# Patient Record
Sex: FEMALE | Race: OTHER
[De-identification: names, ages, dates, MRNs, and addresses within clinical notes are randomized per-mention and may not be internally consistent; named-entity substitution may affect disease eponyms.]

---

## 2018-05-16 VITALS
DIASTOLIC BLOOD PRESSURE: 80 MMHG | SYSTOLIC BLOOD PRESSURE: 119 MMHG | SYSTOLIC BLOOD PRESSURE: 119 MMHG | DIASTOLIC BLOOD PRESSURE: 80 MMHG | SYSTOLIC BLOOD PRESSURE: 119 MMHG | DIASTOLIC BLOOD PRESSURE: 80 MMHG

## 2018-07-30 ENCOUNTER — HOSPITAL ENCOUNTER (EMERGENCY)
Dept: HOSPITAL 61 - ER | Age: 23
Discharge: HOME | End: 2018-07-30
Payer: COMMERCIAL

## 2018-07-30 VITALS — DIASTOLIC BLOOD PRESSURE: 80 MMHG | SYSTOLIC BLOOD PRESSURE: 122 MMHG

## 2018-07-30 DIAGNOSIS — N89.8: ICD-10-CM
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
## 2018-07-30 DIAGNOSIS — O26.891: Primary | ICD-10-CM

## 2018-07-30 DIAGNOSIS — M54.5: ICD-10-CM

## 2018-07-30 DIAGNOSIS — Z3A.10: ICD-10-CM

## 2018-07-30 DIAGNOSIS — Z90.89: ICD-10-CM
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
## 2018-07-30 LAB
BACTERIA #/AREA URNS HPF: (no result) /HPF
BILIRUBIN,URINE: NEGATIVE
CLARITY,URINE: CLEAR
COLOR,URINE: YELLOW
GLUCOSE,URINE: NEGATIVE MG/DL
NITRITE UR QL STRIP: NEGATIVE
PH,URINE: 6
PROTEIN,URINE: NEGATIVE MG/DL
RBC,URINE: (no result) /HPF (ref 0–2)
SPECIFIC GRAVITY,URINE: >=1.03
SQUAMOUS EPITHELIAL CELL,UR: (no result) /LPF
UROBILINOGEN,URINE: 0.2 MG/DL
WBC #/AREA URNS HPF: (no result) /HPF (ref 0–4)

## 2018-07-30 PROCEDURE — 99284 EMERGENCY DEPT VISIT MOD MDM: CPT

## 2018-07-30 PROCEDURE — 87491 CHLMYD TRACH DNA AMP PROBE: CPT

## 2018-07-30 PROCEDURE — 81001 URINALYSIS AUTO W/SCOPE: CPT

## 2018-07-30 PROCEDURE — 87591 N.GONORRHOEAE DNA AMP PROB: CPT

## 2018-07-30 RX ADMIN — ACETAMINOPHEN 1 MG: 500 TABLET ORAL at 22:03

## 2018-07-30 RX ADMIN — CYCLOBENZAPRINE HYDROCHLORIDE 1 MG: 10 TABLET, FILM COATED ORAL at 22:03

## 2018-11-09 ENCOUNTER — HOSPITAL ENCOUNTER (OUTPATIENT)
Dept: HOSPITAL 61 - US | Age: 23
Discharge: HOME | End: 2018-11-09
Attending: FAMILY MEDICINE
Payer: COMMERCIAL

## 2018-11-09 DIAGNOSIS — Z34.82: Primary | ICD-10-CM

## 2018-11-09 DIAGNOSIS — Z3A.24: ICD-10-CM

## 2018-11-09 PROCEDURE — 76805 OB US >/= 14 WKS SNGL FETUS: CPT

## 2018-11-09 NOTE — RAD
OB ultrasound dated 11/9/2018:

 

No comparison available.

Clinical Indication: Size and dates.

 

Findings:

 

There is a single intrauterine gestation in transverse presentation. The 

placenta is posterior in location without evidence of placental previa. 

The amount of amniotic fluid appears appropriate. Cervical length is 

estimated at 4.9 cm.

 

Biometrical data is as follows:

 

BPD = 5.7 cm for 23 weeks 3 days.

HC   = 21.9 cm for 24 weeks 0 days.

AC   = 18.9 cmfor 23 weeks 5 days.

FL    = 4.7 cm for  25 weeks 6 days.

 

Overall, the estimated sonographic gestational age is 24 weeks 2 days for 

an estimated date of delivery of 2/27/2019. 

 

Estimated fetal weight is 703 g.

 

A 4 chamber heart is identified with positive cardiac activity. The 

estimated fetal heart rate is 145 beats per minute. Bilateral upper and 

lower extremities are identified. There is a three-vessel cord with cord 

insertion visualized. Fetal stomach and urinary bladder are identified. 

Both kidneys are seen.

The fetal spine and brain are unremarkable. No gross fetal abnormalities 

are identified.

 

Impression:

 

Single viable intrauterine gestation with estimated sonographic 

gestational age of 24 weeks 2 days. No apparent fetal abnormality.

 

Electronically signed by: Skinny Adame MD (11/9/2018 5:08 PM) 

[id number]

## 2018-11-15 ENCOUNTER — HOSPITAL ENCOUNTER (OUTPATIENT)
Dept: HOSPITAL 61 - LAB | Age: 23
Discharge: HOME | End: 2018-11-15
Attending: FAMILY MEDICINE
Payer: COMMERCIAL

## 2018-11-15 DIAGNOSIS — O46.8X2: Primary | ICD-10-CM

## 2018-11-15 DIAGNOSIS — Z3A.25: ICD-10-CM

## 2018-11-15 LAB
HCT VFR BLD CALC: 33.6 % (ref 36–47)
HGB BLD-MCNC: 11.6 G/DL (ref 12–15.5)
MCHC RBC AUTO-ENTMCNC: 34 G/DL (ref 31–37)

## 2018-11-15 PROCEDURE — 85014 HEMATOCRIT: CPT

## 2018-11-15 PROCEDURE — 82947 ASSAY GLUCOSE BLOOD QUANT: CPT

## 2018-11-15 PROCEDURE — 36415 COLL VENOUS BLD VENIPUNCTURE: CPT

## 2018-11-15 PROCEDURE — 85018 HEMOGLOBIN: CPT

## 2018-11-15 PROCEDURE — 82950 GLUCOSE TEST: CPT

## 2018-11-24 ENCOUNTER — HOSPITAL ENCOUNTER (OUTPATIENT)
Dept: HOSPITAL 61 - 3 SO LND | Age: 23
Setting detail: OBSERVATION
Discharge: HOME | End: 2018-11-24
Attending: FAMILY MEDICINE | Admitting: FAMILY MEDICINE
Payer: COMMERCIAL

## 2018-11-24 DIAGNOSIS — R10.32: ICD-10-CM

## 2018-11-24 DIAGNOSIS — Z3A.26: ICD-10-CM

## 2018-11-24 DIAGNOSIS — O26.893: Primary | ICD-10-CM

## 2018-11-24 LAB
APTT PPP: YELLOW S
BACTERIA #/AREA URNS HPF: (no result) /HPF
BILIRUB UR QL STRIP: NEGATIVE
FIBRINOGEN PPP-MCNC: CLEAR MG/DL
NITRITE UR QL STRIP: NEGATIVE
PH UR STRIP: 6.5 [PH]
PROT UR STRIP-MCNC: NEGATIVE MG/DL
RBC #/AREA URNS HPF: (no result) /HPF (ref 0–2)
SQUAMOUS #/AREA URNS LPF: (no result) /LPF
UROBILINOGEN UR-MCNC: 0.2 MG/DL
WBC #/AREA URNS HPF: (no result) /HPF (ref 0–4)

## 2018-11-24 PROCEDURE — 81001 URINALYSIS AUTO W/SCOPE: CPT

## 2018-11-24 PROCEDURE — G0379 DIRECT REFER HOSPITAL OBSERV: HCPCS

## 2019-01-03 ENCOUNTER — HOSPITAL ENCOUNTER (OUTPATIENT)
Dept: HOSPITAL 61 - 3 SO LND | Age: 24
Setting detail: OBSERVATION
Discharge: HOME | End: 2019-01-03
Attending: FAMILY MEDICINE | Admitting: FAMILY MEDICINE
Payer: COMMERCIAL

## 2019-01-03 DIAGNOSIS — R10.9: ICD-10-CM

## 2019-01-03 DIAGNOSIS — O36.8130: Primary | ICD-10-CM

## 2019-01-03 DIAGNOSIS — O26.893: ICD-10-CM

## 2019-01-03 DIAGNOSIS — Z3A.32: ICD-10-CM

## 2019-01-03 LAB
APTT PPP: YELLOW S
BACTERIA #/AREA URNS HPF: (no result) /HPF
BILIRUB UR QL STRIP: NEGATIVE
FIBRINOGEN PPP-MCNC: CLEAR MG/DL
NITRITE UR QL STRIP: NEGATIVE
PH UR STRIP: 6.5 [PH]
PROT UR STRIP-MCNC: NEGATIVE MG/DL
RBC #/AREA URNS HPF: 0 /HPF (ref 0–2)
SQUAMOUS #/AREA URNS LPF: (no result) /LPF
UROBILINOGEN UR-MCNC: 0.2 MG/DL
WBC #/AREA URNS HPF: (no result) /HPF (ref 0–4)

## 2019-01-03 PROCEDURE — G0379 DIRECT REFER HOSPITAL OBSERV: HCPCS

## 2019-01-03 PROCEDURE — 87086 URINE CULTURE/COLONY COUNT: CPT

## 2019-01-03 PROCEDURE — 81001 URINALYSIS AUTO W/SCOPE: CPT

## 2019-01-03 PROCEDURE — G0378 HOSPITAL OBSERVATION PER HR: HCPCS

## 2019-02-25 ENCOUNTER — HOSPITAL ENCOUNTER (INPATIENT)
Dept: HOSPITAL 61 - 3 SO LND | Age: 24
LOS: 3 days | Discharge: HOME | End: 2019-02-28
Attending: FAMILY MEDICINE | Admitting: FAMILY MEDICINE
Payer: COMMERCIAL

## 2019-02-25 VITALS — HEIGHT: 59 IN | WEIGHT: 167 LBS | BODY MASS INDEX: 33.67 KG/M2

## 2019-02-25 DIAGNOSIS — Z3A.40: ICD-10-CM

## 2019-02-25 LAB
APTT PPP: YELLOW S
BACTERIA #/AREA URNS HPF: (no result) /HPF
BASOPHILS # BLD AUTO: 0 X10^3/UL (ref 0–0.2)
BASOPHILS NFR BLD: 0 % (ref 0–3)
BILIRUB UR QL STRIP: NEGATIVE
EOSINOPHIL NFR BLD: 0.1 X10^3/UL (ref 0–0.7)
EOSINOPHIL NFR BLD: 1 % (ref 0–3)
ERYTHROCYTE [DISTWIDTH] IN BLOOD BY AUTOMATED COUNT: 15.4 % (ref 11.5–14.5)
FIBRINOGEN PPP-MCNC: CLEAR MG/DL
HCT VFR BLD CALC: 34.1 % (ref 36–47)
HGB BLD-MCNC: 11.5 G/DL (ref 12–15.5)
LYMPHOCYTES # BLD: 1.5 X10^3/UL (ref 1–4.8)
LYMPHOCYTES NFR BLD AUTO: 17 % (ref 24–48)
MCH RBC QN AUTO: 28 PG (ref 25–35)
MCHC RBC AUTO-ENTMCNC: 34 G/DL (ref 31–37)
MCV RBC AUTO: 84 FL (ref 79–100)
MONO #: 0.6 X10^3/UL (ref 0–1.1)
MONOCYTES NFR BLD: 6 % (ref 0–9)
NEUT #: 6.7 X10^3UL (ref 1.8–7.7)
NEUTROPHILS NFR BLD AUTO: 75 % (ref 31–73)
NITRITE UR QL STRIP: NEGATIVE
PH UR STRIP: 6 [PH]
PLATELET # BLD AUTO: 189 X10^3/UL (ref 140–400)
PROT UR STRIP-MCNC: NEGATIVE MG/DL
RBC # BLD AUTO: 4.07 X10^6/UL (ref 3.5–5.4)
RBC #/AREA URNS HPF: 0 /HPF (ref 0–2)
SQUAMOUS #/AREA URNS LPF: (no result) /LPF
UROBILINOGEN UR-MCNC: 0.2 MG/DL
WBC # BLD AUTO: 8.8 X10^3/UL (ref 4–11)
WBC #/AREA URNS HPF: (no result) /HPF (ref 0–4)

## 2019-02-25 PROCEDURE — 92585: CPT

## 2019-02-25 PROCEDURE — 36415 COLL VENOUS BLD VENIPUNCTURE: CPT

## 2019-02-25 PROCEDURE — 86592 SYPHILIS TEST NON-TREP QUAL: CPT

## 2019-02-25 PROCEDURE — 82247 BILIRUBIN TOTAL: CPT

## 2019-02-25 PROCEDURE — 85025 COMPLETE CBC W/AUTO DIFF WBC: CPT

## 2019-02-25 PROCEDURE — 87086 URINE CULTURE/COLONY COUNT: CPT

## 2019-02-25 PROCEDURE — 85007 BL SMEAR W/DIFF WBC COUNT: CPT

## 2019-02-25 PROCEDURE — 86900 BLOOD TYPING SEROLOGIC ABO: CPT

## 2019-02-25 PROCEDURE — 86901 BLOOD TYPING SEROLOGIC RH(D): CPT

## 2019-02-25 PROCEDURE — 81001 URINALYSIS AUTO W/SCOPE: CPT

## 2019-02-25 PROCEDURE — 86850 RBC ANTIBODY SCREEN: CPT

## 2019-02-25 RX ADMIN — SODIUM CHLORIDE, SODIUM LACTATE, POTASSIUM CHLORIDE, AND CALCIUM CHLORIDE SCH MLS/HR: .6; .31; .03; .02 INJECTION, SOLUTION INTRAVENOUS at 20:56

## 2019-02-25 RX ADMIN — SODIUM CHLORIDE, SODIUM LACTATE, POTASSIUM CHLORIDE, AND CALCIUM CHLORIDE SCH MLS/HR: .6; .31; .03; .02 INJECTION, SOLUTION INTRAVENOUS at 21:46

## 2019-02-26 VITALS — DIASTOLIC BLOOD PRESSURE: 74 MMHG | SYSTOLIC BLOOD PRESSURE: 113 MMHG

## 2019-02-26 VITALS — SYSTOLIC BLOOD PRESSURE: 104 MMHG | DIASTOLIC BLOOD PRESSURE: 67 MMHG

## 2019-02-26 VITALS — DIASTOLIC BLOOD PRESSURE: 58 MMHG | SYSTOLIC BLOOD PRESSURE: 113 MMHG

## 2019-02-26 VITALS — DIASTOLIC BLOOD PRESSURE: 59 MMHG | SYSTOLIC BLOOD PRESSURE: 99 MMHG

## 2019-02-26 PROCEDURE — 0KQM0ZZ REPAIR PERINEUM MUSCLE, OPEN APPROACH: ICD-10-PCS | Performed by: FAMILY MEDICINE

## 2019-02-26 PROCEDURE — 3E0P7VZ INTRODUCTION OF HORMONE INTO FEMALE REPRODUCTIVE, VIA NATURAL OR ARTIFICIAL OPENING: ICD-10-PCS | Performed by: FAMILY MEDICINE

## 2019-02-26 PROCEDURE — 00HU33Z INSERTION OF INFUSION DEVICE INTO SPINAL CANAL, PERCUTANEOUS APPROACH: ICD-10-PCS | Performed by: FAMILY MEDICINE

## 2019-02-26 PROCEDURE — 3E033VJ INTRODUCTION OF OTHER HORMONE INTO PERIPHERAL VEIN, PERCUTANEOUS APPROACH: ICD-10-PCS | Performed by: FAMILY MEDICINE

## 2019-02-26 PROCEDURE — 3E0R3BZ INTRODUCTION OF ANESTHETIC AGENT INTO SPINAL CANAL, PERCUTANEOUS APPROACH: ICD-10-PCS | Performed by: FAMILY MEDICINE

## 2019-02-26 RX ADMIN — DOCUSATE SODIUM PRN MG: 100 CAPSULE, LIQUID FILLED ORAL at 21:27

## 2019-02-26 RX ADMIN — IBUPROFEN PRN MG: 400 TABLET ORAL at 21:29

## 2019-02-26 NOTE — PDOC1
OB - History


Hx of Present Pregnancy


Prenatal Care:  Good Care


Ultrasounds:  Normal mid trimester US


Obstetrical Complications:  None


Medical Complications:  None





Past Family/Social History


*


Past Medical, Surgical, Family and Obstetric Histories reviewed from prenatal 

chart.


Blood Type:  B+


Rubella:  Immune


RPR/VDRL:  Negative


GBS Status:  Negative


HBsAG:  Negative





OB - Chief Complaint & HPI


Date of Admission:


Date of Admission:  2019 at 19:32


Chief Complaint/History


:  2


Para:  1


EDC:  2019


Reason for admission:  induction of labor


Indication for induction:  other


Admission Nurse Assessment Rev:  No





OB - Admission Exam


Physical Exam


HEENT:  Normal, Nasal Mucosa Normal, Oropharynx Normal, Moist Membranes, 

Fontanelles Normal


Heart:  Regular Rate


Lungs:  Clear, Equal


Abdomen:  Gravid


Extremities:  Normal Pulses, No tenderness or swelling


Reflexes:  Normal


Cervical Dilatation:  4cm


Effacement:  100%


Station:  0


Membranes:  Intact


Fetal Heart Rate:  Normal


Accelerations:  Accelerations Present


Decelerations:  Early decelerations


Short Term Variability:  Present


Long Term Variability:  Moderate


Contractions on Admission:  < 5 Minutes Apart


Date/Time Contractions Began;:  19 at 0530


Frequency of Contractions:  q2min


Intensity:  Moderate


A/P


Pt is a 22yo  at 40wga admitted for IOL





1)IOL- pt now in active labor


2)Pain mgmt- pt getting epidural


3)GBS negative


4)Breastfeeding











MICHAEL AMAYA MD 2019 08:30

## 2019-02-26 NOTE — PDOC
VAGINAL DELIVERY


DATE


DATE: 19


TIME


1031


:  2


Para:  1


EDC:  2019


VAGINAL DELIVERY:  VTX


VACCUM ASSISTED:  No


PLACENTA:  Spontaneous


APGAR


9 and 9


SEX:  Female


WEIGHT


Weight 3215g or 7 pounds 1.4oz


Nuchal Cord:  No


Amniotic Fluid:  Clear


PAIN:  Epidural


EPISIOTOMY:  No


EXTENSION:  Yes (2nd degree right periurethral)


REPAIRED WITH


3'0" vicryl


EBL


250cc


COMPLICATIONS


None


CONDITION


Stable


PEDIATRICIAN


Dr. Amaya


Signs of Intrauterine Infectio:  None


Shoulder Dystocia:  No


DIAGNOSIS


Pt is a 24yo [id number] s/p induced vaginal delivery





1)Induced vaginal delivery


2)GBS negative


3)unsure of rupture time- both mom and baby have had no signs of infection, 

will CTM


4)Breastfeeding











MICHAEL AMAYA MD 2019 11:00

## 2019-02-27 VITALS — DIASTOLIC BLOOD PRESSURE: 70 MMHG | SYSTOLIC BLOOD PRESSURE: 110 MMHG

## 2019-02-27 VITALS — DIASTOLIC BLOOD PRESSURE: 63 MMHG | SYSTOLIC BLOOD PRESSURE: 102 MMHG

## 2019-02-27 VITALS — SYSTOLIC BLOOD PRESSURE: 96 MMHG | DIASTOLIC BLOOD PRESSURE: 53 MMHG

## 2019-02-27 VITALS — DIASTOLIC BLOOD PRESSURE: 50 MMHG | SYSTOLIC BLOOD PRESSURE: 80 MMHG

## 2019-02-27 LAB
BASOPHILS # BLD AUTO: 0 X10^3/UL (ref 0–0.2)
BASOPHILS NFR BLD: 0 % (ref 0–3)
EOSINOPHIL NFR BLD: 0.1 X10^3/UL (ref 0–0.7)
EOSINOPHIL NFR BLD: 1 % (ref 0–3)
ERYTHROCYTE [DISTWIDTH] IN BLOOD BY AUTOMATED COUNT: 16.2 % (ref 11.5–14.5)
HCT VFR BLD CALC: 34.1 % (ref 36–47)
HGB BLD-MCNC: 11.2 G/DL (ref 12–15.5)
LYMPHOCYTES # BLD: 2 X10^3/UL (ref 1–4.8)
LYMPHOCYTES NFR BLD AUTO: 22 % (ref 24–48)
MCH RBC QN AUTO: 28 PG (ref 25–35)
MCHC RBC AUTO-ENTMCNC: 33 G/DL (ref 31–37)
MCV RBC AUTO: 85 FL (ref 79–100)
MONO #: 0.5 X10^3/UL (ref 0–1.1)
MONOCYTES NFR BLD: 6 % (ref 0–9)
NEUT #: 6.5 X10^3UL (ref 1.8–7.7)
NEUTROPHILS NFR BLD AUTO: 72 % (ref 31–73)
PLATELET # BLD AUTO: 169 X10^3/UL (ref 140–400)
RBC # BLD AUTO: 4.01 X10^6/UL (ref 3.5–5.4)
WBC # BLD AUTO: 9.1 X10^3/UL (ref 4–11)

## 2019-02-27 RX ADMIN — IBUPROFEN PRN MG: 400 TABLET ORAL at 15:04

## 2019-02-27 RX ADMIN — IBUPROFEN PRN MG: 400 TABLET ORAL at 03:13

## 2019-02-27 RX ADMIN — IBUPROFEN PRN MG: 400 TABLET ORAL at 23:18

## 2019-02-27 RX ADMIN — DOCUSATE SODIUM PRN MG: 100 CAPSULE, LIQUID FILLED ORAL at 15:04

## 2019-02-27 NOTE — PDOC
OB Progress Note


Date of Service


02/27/19


Time of Evaluation


0800


Date:


02/26/19


Time:


1031


Notes


Pt doing well.  Breastfeeding is going well.  Milk has not come completely in.  

Bleeding minimal.  Pain well controlled.


OB VITAL SIGNS:  Temperature (98.2F), Blood Pressure (80/50), Pulse (62), O2 

Sat (99%)


Lab





Laboratory Tests








Test


 2/25/19


20:30 2/25/19


20:45 2/27/19


06:00


 


Urine Collection Type Unknown   


 


Urine Color Yellow   


 


Urine Clarity Clear   


 


Urine pH 6.0   


 


Urine Specific Gravity 1.020   


 


Urine Protein


 Negative mg/dL


(NEG-TRACE) 


 





 


Urine Glucose (UA)


 Negative mg/dL


(NEG) 


 





 


Urine Ketones (Stick)


 Negative mg/dL


(NEG) 


 





 


Urine Blood Negative (NEG)   


 


Urine Nitrite Negative (NEG)   


 


Urine Bilirubin Negative (NEG)   


 


Urine Urobilinogen Dipstick


 0.2 mg/dL (0.2


mg/dL) 


 





 


Urine Leukocyte Esterase Small (NEG)   


 


Urine RBC 0 /HPF (0-2)   


 


Urine WBC


 5-10 /HPF


(0-4) 


 





 


Urine Squamous Epithelial


Cells Mod /LPF 


 


 





 


Urine Bacteria


 Many /HPF


(0-FEW) 


 





 


Urine Mucus Mod /LPF   


 


White Blood Count


 


 8.8 x10^3/uL


(4.0-11.0) 9.1 x10^3/uL


(4.0-11.0)


 


Red Blood Count


 


 4.07 x10^6/uL


(3.50-5.40) 4.01 x10^6/uL


(3.50-5.40)


 


Hemoglobin


 


 11.5 g/dL


(12.0-15.5) 11.2 g/dL


(12.0-15.5)


 


Hematocrit


 


 34.1 %


(36.0-47.0) 34.1 %


(36.0-47.0)


 


Mean Corpuscular Volume  84 fL ()  85 fL () 


 


Mean Corpuscular Hemoglobin  28 pg (25-35)  28 pg (25-35) 


 


Mean Corpuscular Hemoglobin


Concent 


 34 g/dL


(31-37) 33 g/dL


(31-37)


 


Red Cell Distribution Width


 


 15.4 %


(11.5-14.5) 16.2 %


(11.5-14.5)


 


Platelet Count


 


 189 x10^3/uL


(140-400) 169 x10^3/uL


(140-400)


 


Neutrophils (%) (Auto)  75 % (31-73)  72 % (31-73) 


 


Lymphocytes (%) (Auto)  17 % (24-48)  22 % (24-48) 


 


Monocytes (%) (Auto)  6 % (0-9)  6 % (0-9) 


 


Eosinophils (%) (Auto)  1 % (0-3)  1 % (0-3) 


 


Basophils (%) (Auto)  0 % (0-3)  0 % (0-3) 


 


Neutrophils # (Auto)


 


 6.7 x10^3uL


(1.8-7.7) 6.5 x10^3uL


(1.8-7.7)


 


Lymphocytes # (Auto)


 


 1.5 x10^3/uL


(1.0-4.8) 2.0 x10^3/uL


(1.0-4.8)


 


Monocytes # (Auto)


 


 0.6 x10^3/uL


(0.0-1.1) 0.5 x10^3/uL


(0.0-1.1)


 


Eosinophils # (Auto)


 


 0.1 x10^3/uL


(0.0-0.7) 0.1 x10^3/uL


(0.0-0.7)


 


Basophils # (Auto)


 


 0.0 x10^3/uL


(0.0-0.2) 0.0 x10^3/uL


(0.0-0.2)


 


Treponema pallidum Antibody


 


 Nonreactive


(Nonreactive) 











Laboratory Tests








Test


 2/27/19


06:00


 


White Blood Count


 9.1 x10^3/uL


(4.0-11.0)


 


Red Blood Count


 4.01 x10^6/uL


(3.50-5.40)


 


Hemoglobin


 11.2 g/dL


(12.0-15.5)


 


Hematocrit


 34.1 %


(36.0-47.0)


 


Mean Corpuscular Volume 85 fL () 


 


Mean Corpuscular Hemoglobin 28 pg (25-35) 


 


Mean Corpuscular Hemoglobin


Concent 33 g/dL


(31-37)


 


Red Cell Distribution Width


 16.2 %


(11.5-14.5)


 


Platelet Count


 169 x10^3/uL


(140-400)


 


Neutrophils (%) (Auto) 72 % (31-73) 


 


Lymphocytes (%) (Auto) 22 % (24-48) 


 


Monocytes (%) (Auto) 6 % (0-9) 


 


Eosinophils (%) (Auto) 1 % (0-3) 


 


Basophils (%) (Auto) 0 % (0-3) 


 


Neutrophils # (Auto)


 6.5 x10^3uL


(1.8-7.7)


 


Lymphocytes # (Auto)


 2.0 x10^3/uL


(1.0-4.8)


 


Monocytes # (Auto)


 0.5 x10^3/uL


(0.0-1.1)


 


Eosinophils # (Auto)


 0.1 x10^3/uL


(0.0-0.7)


 


Basophils # (Auto)


 0.0 x10^3/uL


(0.0-0.2)








Medications





Current Medications


Sodium Chloride (Normal Saline Flush) 3 ml QSHIFT  PRN IV AFTER MEDS AND BLOOD 

DRAWS;  Start 2/25/19 at 20:00


Ringer's Solution 1,000 ml @  125 mls/hr Q8H IV  Last administered on 2/25/19at 

21:46;  Start 2/25/19 at 19:59


Acetaminophen (Tylenol) 650 mg PRN Q6HRS  PRN PO MILD PAIN / TEMP;  Start 2/25/ 19 at 20:00;  Stop 2/26/19 at 11:15;  Status DC


Ondansetron HCl (Zofran) 4 mg PRN Q4HRS  PRN IV NAUSEA/VOMITING;  Start 2/25/19 

at 20:00


Al Hydroxide/Mg Hydroxide (Mylanta Plus Xs) 30 ml PRN Q4HRS  PRN PO HEARTBURN / 

GAS;  Start 2/25/19 at 20:00;  Stop 2/26/19 at 11:15;  Status DC


Terbutaline Sulfate (Brethine) 0.25 mg 1X PRN  PRN SQ SEE COMMENTS;  Start 2/25/ 19 at 20:00;  Stop 2/26/19 at 19:59;  Status DC


Lidocaine HCl (Xylocaine 1% Pf 30ml Vial) 30 ml 1X PRN  PRN INJ SEE COMMENTS;  

Start 2/25/19 at 20:00;  Stop 2/27/19 at 19:59


Oxytocin/Sodium Chloride 500 ml @ 0 mls/hr CONT PRN  PRN IV Post delivery 

bleeding Last administered on 2/26/19at 10:08;  Start 2/25/19 at 20:00


Ibuprofen (Motrin) 800 mg PRN Q6HRS  PRN PO PAIN PREVENTION Last administered 

on 2/27/19at 03:13;  Start 2/25/19 at 20:00


Dinoprostone (Cervidil) 10 mg 1X  ONCE VG  Last administered on 2/25/19at 20:55

;  Start 2/25/19 at 20:30;  Stop 2/25/19 at 20:31;  Status DC


Ringer's Solution 1,000 ml @  1,000 mls/hr Q1H IV  Last administered on 2/26/ 19at 08:45;  Start 2/26/19 at 08:19;  Stop 2/26/19 at 09:18;  Status DC


Ringer's Solution 500 ml @  500 mls/hr 1X PRN  PRN IV HYPOTENSION;  Start 2/26/ 19 at 08:30;  Stop 2/27/19 at 08:29


Ephedrine Sulfate (ePHEDrine PF IN SALINE SYRINGE) 10 mg PRN Q2MIN  PRN IV IF 

SBP<90;  Start 2/26/19 at 08:30


Phenylephrine HCl (PHENYLEPHRINE in 0.9% NACL PF) 0.05 mg PRN Q2MIN  PRN IV SBP 

less than 90;  Start 2/26/19 at 08:30


Atropine Sulfate (ATROPINE 0.5mg SYRINGE) 0.4 mg PRN Q2MIN  PRN IV FOR 

SYMPTOMATIC BRADYCARDIA;  Start 2/26/19 at 08:30


Naloxone HCl (Narcan) 0.04 mg PRN Q1MIN  PRN IV SEE COMMENTS;  Start 2/26/19 at 

08:30


Fentanyl Citrate (Fentanyl 2ml Vial) 100 mcg PRN 1X  PRN EPI FOR ANESTHESIA 

Last administered on 2/26/19at 08:49;  Start 2/26/19 at 08:30;  Stop 2/27/19 at 

08:29


Bupivacaine HCl (Sensorcaine Mpf 0.25%) 10 ml PRN 1X  PRN EPID FOR ANESTHESIA 

Last administered on 2/26/19at 08:50;  Start 2/26/19 at 08:30;  Stop 2/27/19 at 

08:29


Ropivacaine/ Fentanyl/NS 50 ml @ 14 mls/hr CONT  PRN EPID PAIN Last 

administered on 2/26/19at 08:46;  Start 2/26/19 at 08:30


Ondansetron HCl (Zofran) 4 mg PRN Q6HRS  PRN IV NAUSEA/VOMITING;  Start 2/26/19 

at 08:30


Prochlorperazine Edisylate (Compazine) 5 mg PRN Q6HRS  PRN IV NAUSEA/VOMITING;  

Start 2/26/19 at 08:30


Diphenhydramine HCl (Benadryl) 12.5 mg PRN Q2HR  PRN IV ITCHING;  Start 2/26/19 

at 08:30


Nalbuphine HCl (Nubain) 2.5 mg PRN Q2HR  PRN IV ITCHING;  Start 2/26/19 at 08:30


Ropivacaine/ Sodium Chloride (ROPIVacaine 0.2% - 0.9%NACL PF) 40 mg 1X PRN  PRN 

EPID PER ANESTHESIA;  Start 2/26/19 at 08:30;  Stop 2/27/19 at 08:19


Sodium Chloride (Normal Saline Flush) 10 ml QSHIFT  PRN IV AFTER MEDS AND BLOOD 

DRAWS;  Start 2/26/19 at 11:15


Oxytocin/Sodium Chloride 500 ml @  62.5 mls/hr CONT  PRN IV SEE I/O RECORD;  

Start 2/26/19 at 11:15;  Stop 2/26/19 at 19:14;  Status DC


Acetaminophen (Tylenol) 650 mg PRN Q6HRS  PRN PO MILD PAIN / TEMP;  Start 2/26/ 19 at 11:15


Ibuprofen (Motrin) 800 mg PRN Q6HRS  PRN PO MODERATE PAIN;  Start 2/26/19 at 11:

15


Docusate Sodium (Colace) 100 mg PRN DAILY  PRN PO CONSTIPATION Last 

administered on 2/26/19at 21:27;  Start 2/26/19 at 11:15


Al Hydroxide/Mg Hydroxide (Mylanta Plus Xs) 30 ml PRN Q4HRS  PRN PO HEARTBURN / 

GAS;  Start 2/26/19 at 11:15


Simethicone (Gas-X) 80 mg PRN AFTMEALHC  PRN PO GAS / BLOATING;  Start 2/26/19 

at 11:15


Diphenhydramine HCl (Benadryl) 25 mg PRN Q6HRS  PRN PO ITCHING;  Start 2/26/19 

at 11:15


Benzocaine (Americaine) 1 spray PRN QID  PRN TP TOPICAL PAIN;  Start 2/26/19 at 

11:15


Phenyleph/Shark Oil/Min Oil/Petrol (Preparation H) 1 valentina PRN QID  PRN RC RECTAL 

PAIN;  Start 2/26/19 at 11:15


Hydrocortisone (Cortaid) 1 valentina PRN QID  PRN TP PERINEAL PAIN;  Start 2/26/19 at 

11:15





Active Scripts


Active


Cyclobenzaprine Hcl 10 Mg Tablet 10 Mg PO TID


Amoxicillin 875 Mg Tablet 1 Tab PO BID


Triamcinolone Acetonide 0.1% Oint (Triamcinolone Acetonide) 15 Gm Oint...g. 1 

Valentina TP TID


     MIX WITH EUCERIN


     AS DIRECTED BY PHYSICIAN


Reported


Prenata Chewable Tablet (Prenatal Vit37/Iron/Folic Acid) 1 Each Tab.chew 1 Each 

PO


Exam


GEN: NAD, AOx3


HEENT: MMM, EOMI, no scleral icterus/injection


Cardiac: RRR, no M/R/G


Lungs: CTAB


Abd: fundus 2cm below umbilicus


Ext: no erythema/edema LE bilaterally


Neuro: CN2-12 GI


Assessment


Pt is a 24yo P4wheP4 s/p induced vaginal delivery at 40wga





1)Induced vaginal delivery


2)Pain mgmt- well controlled with Ibuprofen.


3)GBS negative


4)Breastfeeding











MICHAEL AMAYA MD Feb 27, 2019 08:21

## 2019-02-28 VITALS — DIASTOLIC BLOOD PRESSURE: 62 MMHG | SYSTOLIC BLOOD PRESSURE: 94 MMHG

## 2019-02-28 NOTE — PDOC3
OB DISCHARGE SUMMARY


DATE OF ADMISSION:  


02/26/19


DATE OF DISCHARGE:  


02/28/19


REASON FOR ADMISSION:   Induction of labor


INTRAPARTUM PROCEDURES:  Spontanous Vag Deliv


DISCHARGE DIAGNOSIS:  Term Pregnancy Delivered


DISCHARGE INFORMATION:  Activity (As tolerated), Diet (Regular), Medications (

Ibuprofen, Docusate), Discharge to (Home)


HOSPITAL COURSE


Pt is a 22yo [id number] s/p induced vaginal delivery at 40wga





1)Induced vaginal delivery


2)Pain mgmt- well controlled with Ibuprofen.


3)GBS negative


4)Breastfeeding











MICHAEL AMAYA MD Feb 28, 2019 08:52

## 2019-12-23 VITALS
DIASTOLIC BLOOD PRESSURE: 61 MMHG | DIASTOLIC BLOOD PRESSURE: 61 MMHG | SYSTOLIC BLOOD PRESSURE: 100 MMHG | SYSTOLIC BLOOD PRESSURE: 100 MMHG | SYSTOLIC BLOOD PRESSURE: 100 MMHG | DIASTOLIC BLOOD PRESSURE: 61 MMHG

## 2020-03-25 ENCOUNTER — HOSPITAL ENCOUNTER (OUTPATIENT)
Dept: HOSPITAL 61 - LAB | Age: 25
Discharge: HOME | End: 2020-03-25
Attending: FAMILY MEDICINE
Payer: COMMERCIAL

## 2020-03-25 DIAGNOSIS — Z00.00: Primary | ICD-10-CM

## 2020-03-25 LAB
ALBUMIN SERPL-MCNC: 3.9 G/DL (ref 3.4–5)
ALBUMIN/GLOB SERPL: 1 {RATIO} (ref 1–1.7)
ALP SERPL-CCNC: 73 U/L (ref 46–116)
ALT SERPL-CCNC: 18 U/L (ref 14–59)
ANION GAP SERPL CALC-SCNC: 9 MMOL/L (ref 6–14)
AST SERPL-CCNC: 13 U/L (ref 15–37)
BASOPHILS # BLD AUTO: 0 X10^3/UL (ref 0–0.2)
BASOPHILS NFR BLD: 0 % (ref 0–3)
BILIRUB SERPL-MCNC: 0.3 MG/DL (ref 0.2–1)
BUN SERPL-MCNC: 11 MG/DL (ref 7–20)
BUN/CREAT SERPL: 18 (ref 6–20)
CALCIUM SERPL-MCNC: 8.8 MG/DL (ref 8.5–10.1)
CHLORIDE SERPL-SCNC: 102 MMOL/L (ref 98–107)
CHOLEST SERPL-MCNC: 196 MG/DL (ref 0–200)
CHOLEST/HDLC SERPL: 4.3 {RATIO}
CO2 SERPL-SCNC: 28 MMOL/L (ref 21–32)
CREAT SERPL-MCNC: 0.6 MG/DL (ref 0.6–1)
EOSINOPHIL NFR BLD: 0.2 X10^3/UL (ref 0–0.7)
EOSINOPHIL NFR BLD: 3 % (ref 0–3)
ERYTHROCYTE [DISTWIDTH] IN BLOOD BY AUTOMATED COUNT: 13.6 % (ref 11.5–14.5)
GFR SERPLBLD BASED ON 1.73 SQ M-ARVRAT: 120.8 ML/MIN
GLOBULIN SER-MCNC: 4 G/DL (ref 2.2–3.8)
GLUCOSE SERPL-MCNC: 86 MG/DL (ref 70–99)
HCT VFR BLD CALC: 39.7 % (ref 36–47)
HDLC SERPL-MCNC: 46 MG/DL (ref 40–60)
HGB BLD-MCNC: 13.2 G/DL (ref 12–15.5)
LDLC: 123 MG/DL (ref 0–100)
LYMPHOCYTES # BLD: 2.3 X10^3/UL (ref 1–4.8)
LYMPHOCYTES NFR BLD AUTO: 25 % (ref 24–48)
MCH RBC QN AUTO: 27 PG (ref 25–35)
MCHC RBC AUTO-ENTMCNC: 33 G/DL (ref 31–37)
MCV RBC AUTO: 81 FL (ref 79–100)
MONO #: 0.5 X10^3/UL (ref 0–1.1)
MONOCYTES NFR BLD: 5 % (ref 0–9)
NEUT #: 6.1 X10^3/UL (ref 1.8–7.7)
NEUTROPHILS NFR BLD AUTO: 66 % (ref 31–73)
PLATELET # BLD AUTO: 305 X10^3/UL (ref 140–400)
POTASSIUM SERPL-SCNC: 3.8 MMOL/L (ref 3.5–5.1)
PROT SERPL-MCNC: 7.9 G/DL (ref 6.4–8.2)
RBC # BLD AUTO: 4.9 X10^6/UL (ref 3.5–5.4)
SODIUM SERPL-SCNC: 139 MMOL/L (ref 136–145)
TRIGL SERPL-MCNC: 137 MG/DL (ref 0–150)
VLDLC: 27 MG/DL (ref 0–40)
WBC # BLD AUTO: 9.1 X10^3/UL (ref 4–11)

## 2020-03-25 PROCEDURE — 84443 ASSAY THYROID STIM HORMONE: CPT

## 2020-03-25 PROCEDURE — 85025 COMPLETE CBC W/AUTO DIFF WBC: CPT

## 2020-03-25 PROCEDURE — 80061 LIPID PANEL: CPT

## 2020-03-25 PROCEDURE — 80053 COMPREHEN METABOLIC PANEL: CPT

## 2020-03-25 PROCEDURE — 36415 COLL VENOUS BLD VENIPUNCTURE: CPT

## 2020-04-02 ENCOUNTER — HOSPITAL ENCOUNTER (OUTPATIENT)
Dept: HOSPITAL 61 - KCIC MRI | Age: 25
Discharge: HOME | End: 2020-04-02
Attending: ORTHOPAEDIC SURGERY
Payer: COMMERCIAL

## 2020-04-02 DIAGNOSIS — M25.461: Primary | ICD-10-CM

## 2020-04-02 PROCEDURE — 73721 MRI JNT OF LWR EXTRE W/O DYE: CPT

## 2020-04-02 NOTE — [facility]
Examination: MRI of the right knee without contrast

 

HISTORY: History of right knee pain

 

COMPARISON: None available 

 

Technique: Multiplanar, multisequence MR imaging of the right knee were 

performed without contrast.

 

 

Findings:

 

The anterior cruciate ligament, posterior cruciate ligament appear intact.

The medial meniscus, lateral meniscus appear intact. The medial collateral

ligament appears intact. The lateral collateral ligamentous complex 

including the fibular collateral ligament, biceps femoris tendon, 

popliteus tendon appear intact.

 

Extensor mechanism is intact. Small knee joint effusion is identified. The

medial retinaculum, lateral retinaculum appear intact. There is increased 

T2 signal identified in the Hoffa's fat in deep to the patella laterally.

 

 

 

IMPRESSION:

 

1. Increased T2 signal identified in the Hoffa's fat deep to the patella 

laterally probably secondary to impingement.

 

2. Small knee joint effusion.

 

Electronically signed by: Saad Wood MD (4/2/2020 12:59 PM) [id number]

## 2020-04-22 ENCOUNTER — HOSPITAL ENCOUNTER (OUTPATIENT)
Dept: HOSPITAL 61 - KCIC US | Age: 25
Discharge: HOME | End: 2020-04-22
Attending: NURSE PRACTITIONER
Payer: COMMERCIAL

## 2020-04-22 DIAGNOSIS — O34.80: Primary | ICD-10-CM

## 2020-04-22 DIAGNOSIS — N83.291: ICD-10-CM

## 2020-04-22 DIAGNOSIS — Z3A.00: ICD-10-CM

## 2020-04-22 PROCEDURE — 76817 TRANSVAGINAL US OBSTETRIC: CPT

## 2020-04-22 PROCEDURE — 76801 OB US < 14 WKS SINGLE FETUS: CPT

## 2020-04-22 NOTE — [facility]
Study: US OB <14 WKS W/TV 

 

DATE:  4/22/2020 8:00 AM

 

INDICATION: Evaluate pregnancy viability.

 

COMPARISON: None recently.

 

TECHNIQUE: Transabdominal ultrasonography of the pelvis was performed. 

Color Doppler and duplex were utilized as appropriate.

 

FINDINGS:

 

The uterus is measured at 8.3 x 5.0 x 7.0 cm. The endometrial echo 

measures up to 1.7 cm in width. No intrauterine pregnancy is identified at

this time. No abnormal vascularity to the endometrium. No uterine 

parenchymal abnormality.

 

The left ovary measures 2.5 x 2.2 x 2.4 cm with normal Doppler flow.

 

The right ovary measures 3.5 x 3.7 x 2.3 cm. Doppler flow is maintained to

the right ovary. Centrally hypoechoic structure within the right ovary 

measures 2.5 x 1.9 x 1.9 cm and is without overt hypervascularity. An 

additional hypoechoic structure at the upper aspect of the right ovary 

measures 2.2 x 1.7 x 1.6 cm. There is no significant vascularity to this 

finding as well.

 

Small amount of simple appearing free fluid within the deep pelvis.

 

IMPRESSION:

 

1.  No intrauterine pregnancy is identified at this time. Correlation is 

needed with beta hCG levels to determine if one would be expected to be 

seen. Based on the imaging findings alone, an early intrauterine 

pregnancy, failed first trimester pregnancy or nonvisualized ectopic 

pregnancy are all considerations. Short-term clinical follow-up is needed 

with subsequent ultrasound as deemed necessary.

2.  Two right ovarian cystic foci one of which appears represent a corpus 

luteum cyst and the other an uncomplicated cyst. These cysts do not 

exhibit features typical of an ovarian ectopic but again clinical 

correlation and follow-up is needed.

3.  Small amount of simple appearing free fluid within the deep pelvis.

 

Electronically signed by: TORI SHEFFIELD MD (4/22/2020 10:06 AM) 

[id number]

## 2020-04-25 ENCOUNTER — HOSPITAL ENCOUNTER (INPATIENT)
Dept: HOSPITAL 61 - ER | Age: 25
LOS: 1 days | Discharge: HOME | DRG: 819 | End: 2020-04-26
Attending: OBSTETRICS & GYNECOLOGY | Admitting: OBSTETRICS & GYNECOLOGY
Payer: COMMERCIAL

## 2020-04-25 VITALS — DIASTOLIC BLOOD PRESSURE: 67 MMHG | SYSTOLIC BLOOD PRESSURE: 105 MMHG

## 2020-04-25 VITALS — BODY MASS INDEX: 31.11 KG/M2 | WEIGHT: 154.32 LBS | HEIGHT: 59 IN

## 2020-04-25 VITALS — SYSTOLIC BLOOD PRESSURE: 96 MMHG | DIASTOLIC BLOOD PRESSURE: 62 MMHG

## 2020-04-25 VITALS — SYSTOLIC BLOOD PRESSURE: 107 MMHG | DIASTOLIC BLOOD PRESSURE: 62 MMHG

## 2020-04-25 VITALS — DIASTOLIC BLOOD PRESSURE: 65 MMHG | SYSTOLIC BLOOD PRESSURE: 104 MMHG

## 2020-04-25 VITALS — SYSTOLIC BLOOD PRESSURE: 89 MMHG | DIASTOLIC BLOOD PRESSURE: 51 MMHG

## 2020-04-25 VITALS — SYSTOLIC BLOOD PRESSURE: 99 MMHG | DIASTOLIC BLOOD PRESSURE: 60 MMHG

## 2020-04-25 VITALS — DIASTOLIC BLOOD PRESSURE: 66 MMHG | SYSTOLIC BLOOD PRESSURE: 108 MMHG

## 2020-04-25 DIAGNOSIS — Z90.49: ICD-10-CM

## 2020-04-25 DIAGNOSIS — O99.011: ICD-10-CM

## 2020-04-25 DIAGNOSIS — Z83.3: ICD-10-CM

## 2020-04-25 DIAGNOSIS — O00.101: Primary | ICD-10-CM

## 2020-04-25 DIAGNOSIS — D64.9: ICD-10-CM

## 2020-04-25 DIAGNOSIS — Z82.49: ICD-10-CM

## 2020-04-25 DIAGNOSIS — K66.0: ICD-10-CM

## 2020-04-25 DIAGNOSIS — Z3A.14: ICD-10-CM

## 2020-04-25 LAB
ALBUMIN SERPL-MCNC: 3.4 G/DL (ref 3.4–5)
ALBUMIN/GLOB SERPL: 0.9 {RATIO} (ref 1–1.7)
ALP SERPL-CCNC: 61 U/L (ref 46–116)
ALT SERPL-CCNC: 15 U/L (ref 14–59)
ANION GAP SERPL CALC-SCNC: 14 MMOL/L (ref 6–14)
APTT PPP: YELLOW S
AST SERPL-CCNC: 14 U/L (ref 15–37)
BACTERIA #/AREA URNS HPF: (no result) /HPF
BASOPHILS # BLD AUTO: 0 X10^3/UL (ref 0–0.2)
BASOPHILS NFR BLD: 0 % (ref 0–3)
BILIRUB SERPL-MCNC: 0.2 MG/DL (ref 0.2–1)
BILIRUB UR QL STRIP: NEGATIVE
BUN SERPL-MCNC: 12 MG/DL (ref 7–20)
BUN/CREAT SERPL: 20 (ref 6–20)
CALCIUM SERPL-MCNC: 8.3 MG/DL (ref 8.5–10.1)
CHLORIDE SERPL-SCNC: 103 MMOL/L (ref 98–107)
CO2 SERPL-SCNC: 23 MMOL/L (ref 21–32)
CREAT SERPL-MCNC: 0.6 MG/DL (ref 0.6–1)
EOSINOPHIL NFR BLD: 0.2 X10^3/UL (ref 0–0.7)
EOSINOPHIL NFR BLD: 1 % (ref 0–3)
ERYTHROCYTE [DISTWIDTH] IN BLOOD BY AUTOMATED COUNT: 14.2 % (ref 11.5–14.5)
ERYTHROCYTE [DISTWIDTH] IN BLOOD BY AUTOMATED COUNT: 14.2 % (ref 11.5–14.5)
FIBRINOGEN PPP-MCNC: CLEAR MG/DL
GFR SERPLBLD BASED ON 1.73 SQ M-ARVRAT: 120.8 ML/MIN
GLOBULIN SER-MCNC: 3.8 G/DL (ref 2.2–3.8)
GLUCOSE SERPL-MCNC: 142 MG/DL (ref 70–99)
HCT VFR BLD CALC: 26.2 % (ref 36–47)
HCT VFR BLD CALC: 35.7 % (ref 36–47)
HGB BLD-MCNC: 11.6 G/DL (ref 12–15.5)
HGB BLD-MCNC: 8.6 G/DL (ref 12–15.5)
LIPASE: 100 U/L (ref 73–393)
LYMPHOCYTES # BLD: 2.5 X10^3/UL (ref 1–4.8)
LYMPHOCYTES NFR BLD AUTO: 20 % (ref 24–48)
MCH RBC QN AUTO: 27 PG (ref 25–35)
MCH RBC QN AUTO: 27 PG (ref 25–35)
MCHC RBC AUTO-ENTMCNC: 33 G/DL (ref 31–37)
MCHC RBC AUTO-ENTMCNC: 33 G/DL (ref 31–37)
MCV RBC AUTO: 83 FL (ref 79–100)
MCV RBC AUTO: 83 FL (ref 79–100)
MONO #: 0.6 X10^3/UL (ref 0–1.1)
MONOCYTES NFR BLD: 5 % (ref 0–9)
NEUT #: 9.6 X10^3/UL (ref 1.8–7.7)
NEUTROPHILS NFR BLD AUTO: 75 % (ref 31–73)
NITRITE UR QL STRIP: NEGATIVE
PH UR STRIP: 5.5 [PH]
PLATELET # BLD AUTO: 229 X10^3/UL (ref 140–400)
PLATELET # BLD AUTO: 339 X10^3/UL (ref 140–400)
POTASSIUM SERPL-SCNC: 3.2 MMOL/L (ref 3.5–5.1)
PROT SERPL-MCNC: 7.2 G/DL (ref 6.4–8.2)
PROT UR STRIP-MCNC: 30 MG/DL
RBC # BLD AUTO: 3.16 X10^6/UL (ref 3.5–5.4)
RBC # BLD AUTO: 4.33 X10^6/UL (ref 3.5–5.4)
RBC #/AREA URNS HPF: 0 /HPF (ref 0–2)
SODIUM SERPL-SCNC: 140 MMOL/L (ref 136–145)
SQUAMOUS #/AREA URNS LPF: (no result) /LPF
UROBILINOGEN UR-MCNC: 0.2 MG/DL
WBC # BLD AUTO: 12.9 X10^3/UL (ref 4–11)
WBC # BLD AUTO: 15.3 X10^3/UL (ref 4–11)
WBC #/AREA URNS HPF: 0 /HPF (ref 0–4)

## 2020-04-25 PROCEDURE — 30233N1 TRANSFUSION OF NONAUTOLOGOUS RED BLOOD CELLS INTO PERIPHERAL VEIN, PERCUTANEOUS APPROACH: ICD-10-PCS | Performed by: OBSTETRICS & GYNECOLOGY

## 2020-04-25 PROCEDURE — 83690 ASSAY OF LIPASE: CPT

## 2020-04-25 PROCEDURE — 85027 COMPLETE CBC AUTOMATED: CPT

## 2020-04-25 PROCEDURE — 0UT54ZZ RESECTION OF RIGHT FALLOPIAN TUBE, PERCUTANEOUS ENDOSCOPIC APPROACH: ICD-10-PCS | Performed by: OBSTETRICS & GYNECOLOGY

## 2020-04-25 PROCEDURE — 81025 URINE PREGNANCY TEST: CPT

## 2020-04-25 PROCEDURE — 76801 OB US < 14 WKS SINGLE FETUS: CPT

## 2020-04-25 PROCEDURE — 86901 BLOOD TYPING SEROLOGIC RH(D): CPT

## 2020-04-25 PROCEDURE — 86920 COMPATIBILITY TEST SPIN: CPT

## 2020-04-25 PROCEDURE — 10T24ZZ RESECTION OF PRODUCTS OF CONCEPTION, ECTOPIC, PERCUTANEOUS ENDOSCOPIC APPROACH: ICD-10-PCS | Performed by: OBSTETRICS & GYNECOLOGY

## 2020-04-25 PROCEDURE — 80053 COMPREHEN METABOLIC PANEL: CPT

## 2020-04-25 PROCEDURE — A7015 AEROSOL MASK USED W NEBULIZE: HCPCS

## 2020-04-25 PROCEDURE — 36415 COLL VENOUS BLD VENIPUNCTURE: CPT

## 2020-04-25 PROCEDURE — 86850 RBC ANTIBODY SCREEN: CPT

## 2020-04-25 PROCEDURE — 85025 COMPLETE CBC W/AUTO DIFF WBC: CPT

## 2020-04-25 PROCEDURE — 84702 CHORIONIC GONADOTROPIN TEST: CPT

## 2020-04-25 PROCEDURE — 76817 TRANSVAGINAL US OBSTETRIC: CPT

## 2020-04-25 PROCEDURE — P9016 RBC LEUKOCYTES REDUCED: HCPCS

## 2020-04-25 PROCEDURE — 80048 BASIC METABOLIC PNL TOTAL CA: CPT

## 2020-04-25 PROCEDURE — 86900 BLOOD TYPING SEROLOGIC ABO: CPT

## 2020-04-25 PROCEDURE — 81001 URINALYSIS AUTO W/SCOPE: CPT

## 2020-04-25 PROCEDURE — G0378 HOSPITAL OBSERVATION PER HR: HCPCS

## 2020-04-25 RX ADMIN — SODIUM CHLORIDE, SODIUM LACTATE, POTASSIUM CHLORIDE, AND CALCIUM CHLORIDE SCH MLS/HR: .6; .31; .03; .02 INJECTION, SOLUTION INTRAVENOUS at 11:52

## 2020-04-25 RX ADMIN — DOCUSATE SODIUM SCH MG: 100 CAPSULE, LIQUID FILLED ORAL at 21:00

## 2020-04-25 RX ADMIN — MORPHINE SULFATE PRN MG: 2 INJECTION, SOLUTION INTRAMUSCULAR; INTRAVENOUS at 15:38

## 2020-04-25 RX ADMIN — PROCHLORPERAZINE EDISYLATE PRN MG: 5 INJECTION INTRAMUSCULAR; INTRAVENOUS at 15:10

## 2020-04-25 RX ADMIN — IBUPROFEN PRN MG: 200 TABLET, FILM COATED ORAL at 20:25

## 2020-04-25 RX ADMIN — PROCHLORPERAZINE EDISYLATE PRN MG: 5 INJECTION INTRAMUSCULAR; INTRAVENOUS at 15:37

## 2020-04-25 RX ADMIN — MORPHINE SULFATE PRN MG: 2 INJECTION, SOLUTION INTRAMUSCULAR; INTRAVENOUS at 15:11

## 2020-04-25 RX ADMIN — SODIUM CHLORIDE, SODIUM LACTATE, POTASSIUM CHLORIDE, AND CALCIUM CHLORIDE SCH MLS/HR: .6; .31; .03; .02 INJECTION, SOLUTION INTRAVENOUS at 15:12

## 2020-04-25 NOTE — PDOC1
OB/GYN H&P


Date of Admission:


Date of Admission:





History of Present Illness:


CC: ectopic pregnancy





HPI: 24y  who presented to the ER with abd pain.  The pt woke up feeling 

fine, but while getting ready for work began to have intense abd pain.  She 

thought it was BM so she attempted to go to the bathroom.  The pain did not 

improve so she presented to the ER.  She was seen by her PCP this week.  She was

feeling more cramps than usual so an u/s was ordered.  The u/s on  revealed:





1.  No intrauterine pregnancy is identified at this time. Correlation is 


needed with beta hCG levels to determine if one would be expected to be 


seen. Based on the imaging findings alone, an early intrauterine 


pregnancy, failed first trimester pregnancy or nonvisualized ectopic 


pregnancy are all considerations. Short-term clinical follow-up is needed 


with subsequent ultrasound as deemed necessary.


2.  Two right ovarian cystic foci one of which appears represent a corpus 


luteum cyst and the other an uncomplicated cyst. These cysts do not 


exhibit features typical of an ovarian ectopic but again clinical 


correlation and follow-up is needed.


3.  Small amount of simple appearing free fluid within the deep pelvis.





A hcg at that [place] at [address].  She was made an appt with an OB for next 

wk.  When she presented to the ER today her u/s revealed:





New mass in the pelvis with no evidence of intrauterine pregnancy. 


Findings are highly suspicious for a ruptured ectopic pregnancy. Recommend


stat OB/GYN consultation.





The OR team was mobilized for a rupture ectopic pregnancy.





Past Medical History:


PMH:


PMH: Denies





OBHx: 19wk indxn for fetal anomaly, TSVD


Gyn: LMP 3/3/20


          10yo / regular


Cardiovascular:  No pertinent hx


Pulmonary:  No pertinent hx


GI:  No pertinent hx


Heme/Onc:  No pertinent hx


Hepatobiliary:  No pertinent hx


Psych:  No pertinent hx


Infectious disease:  No pertinent hx


Endocrine:  No pertinent hx





Past Surgical History:


PSH: Lap Appy





Social History:





SH: no tob, no EtOH


FH: mother  DM, HTN, father - preDM


Smoke:  No


ALCOHOL:  none


Drugs:  None





Medications:


Meds:





Current Medications








 Medications


  (Trade)  Dose


 Ordered  Sig/Rosa


 Route


 PRN Reason  Start Time


 Stop Time Status Last Admin


Dose Admin


 


 Sodium Chloride  500 ml @ 


 500 mls/hr  1X  ONCE


 IV


   20 08:45


 20 09:44 DC 20 09:00





 


 Fentanyl Citrate


  (Fentanyl 2ml


 Vial)  50 mcg  1X  ONCE


 IV


   20 11:45


 20 11:46 DC 20 11:51














Allergies:


Coded Allergies:  


     No Known Drug Allergies (Unverified , 18)





Physical Exam:


Vital Signs:





                                   Vital Signs








  Date Time  Temp Pulse Resp B/P (MAP) Pulse Ox O2 Delivery O2 Flow Rate FiO2


 


20 11:51   18  98 Room Air  


 


20 09:11  75  114/69 (84)    


 


20 08:00 98.6       





 98.6       








PE:


GENERAL:       No apparent distress. Alert and oriented.


HEENT:            Head normocephalic, atraumatic.


NECK:              Supple


LUNGS:            Clear to auscultation.


HEART:            RRR, S1, S2 present, pulses intact


ABDOMEN:       Soft, positive bowel sounds.  Tender diffusely


EXTREMITIES:  No cyanosis or edema.


NEUROLOGIC:  Normal speech, normal tone


PSYCHIATRIC:  Normal affect, normal mood.


SKIN:                No ulceration.


Labs:





                                Laboratory Tests








Test


 20


08:10 20


08:14 20


08:17 20


08:19


 


Urine Collection Type Unknown     


 


Urine Color Yellow     


 


Urine Clarity Clear     


 


Urine pH


 5.5 (<5.0-8.0)


 


 


 





 


Urine Specific Gravity


 1.025


(1.000-1.030) 


 


 





 


Urine Protein


 30 mg/dL


(NEG-TRACE) 


 


 





 


Urine Glucose (UA)


 Negative mg/dL


(NEG) 


 


 





 


Urine Ketones (Stick)


 Negative mg/dL


(NEG) 


 


 





 


Urine Blood


 Negative (NEG)


 


 


 





 


Urine Nitrite


 Negative (NEG)


 


 


 





 


Urine Bilirubin


 Negative (NEG)


 


 


 





 


Urine Urobilinogen Dipstick


 0.2 mg/dL (0.2


mg/dL) 


 


 





 


Urine Leukocyte Esterase


 Negative (NEG)


 


 


 





 


Urine RBC 0 /HPF (0-2)     


 


Urine WBC 0 /HPF (0-4)     


 


Urine Squamous Epithelial


Cells Many /LPF  


 


 


 





 


Urine Bacteria


 Few /HPF


(0-FEW) 


 


 





 


Urine Mucus Slight /LPF     


 


White Blood Count


 


 12.9 x10^3/uL


(4.0-11.0)  H 


 





 


Red Blood Count


 


 4.33 x10^6/uL


(3.50-5.40) 


 





 


Hemoglobin


 


 11.6 g/dL


(12.0-15.5)  L 


 





 


Hematocrit


 


 35.7 %


(36.0-47.0)  L 


 





 


Mean Corpuscular Volume


 


 83 fL ()


 


 





 


Mean Corpuscular Hemoglobin  27 pg (25-35)    


 


Mean Corpuscular Hemoglobin


Concent 


 33 g/dL


(31-37) 


 





 


Red Cell Distribution Width


 


 14.2 %


(11.5-14.5) 


 





 


Platelet Count


 


 339 x10^3/uL


(140-400) 


 





 


Neutrophils (%) (Auto)  75 % (31-73)  H  


 


Lymphocytes (%) (Auto)  20 % (24-48)  L  


 


Monocytes (%) (Auto)  5 % (0-9)    


 


Eosinophils (%) (Auto)  1 % (0-3)    


 


Basophils (%) (Auto)  0 % (0-3)    


 


Neutrophils # (Auto)


 


 9.6 x10^3/uL


(1.8-7.7)  H 


 





 


Lymphocytes # (Auto)


 


 2.5 x10^3/uL


(1.0-4.8) 


 





 


Monocytes # (Auto)


 


 0.6 x10^3/uL


(0.0-1.1) 


 





 


Eosinophils # (Auto)


 


 0.2 x10^3/uL


(0.0-0.7) 


 





 


Basophils # (Auto)


 


 0.0 x10^3/uL


(0.0-0.2) 


 





 


Sodium Level


 


 140 mmol/L


(136-145) 


 





 


Potassium Level


 


 3.2 mmol/L


(3.5-5.1)  L 


 





 


Chloride Level


 


 103 mmol/L


() 


 





 


Carbon Dioxide Level


 


 23 mmol/L


(21-32) 


 





 


Anion Gap  14 (6-14)    


 


Blood Urea Nitrogen


 


 12 mg/dL


(7-20) 


 





 


Creatinine


 


 0.6 mg/dL


(0.6-1.0) 


 





 


Estimated GFR


(Cockcroft-Gault) 


 120.8  


 


 





 


BUN/Creatinine Ratio  20 (6-20)    


 


Glucose Level


 


 142 mg/dL


(70-99)  H 


 





 


Calcium Level


 


 8.3 mg/dL


(8.5-10.1)  L 


 





 


Total Bilirubin


 


 0.2 mg/dL


(0.2-1.0) 


 





 


Aspartate Amino Transferase


(AST) 


 14 U/L (15-37)


L 


 





 


Alanine Aminotransferase (ALT)


 


 15 U/L (14-59)


 


 





 


Alkaline Phosphatase


 


 61 U/L


() 


 





 


Total Protein


 


 7.2 g/dL


(6.4-8.2) 


 





 


Albumin


 


 3.4 g/dL


(3.4-5.0) 


 





 


Albumin/Globulin Ratio


 


 0.9 (1.0-1.7)


L 


 





 


Lipase


 


 100 U/L


() 


 





 


POC Urine HCG, Qualitative


 


 


 Hcg positive


(Negative) 





 


Maternal Serum HCG Beta


Subunit 


 


 


 05690 mIU/mL


(0-5)  H





                                Laboratory Tests


20 08:14








                                Laboratory Tests


20 08:14








                                Laboratory Tests


20 08:14











Assessment & Plan:


A/P 24y  with rupture right ectopic pregnancy


1.) Rupture ectopic pregnancy  discussed tx options with pt, since ruptured 

medical management contraindicated, discussed salpingostomy versus 

salpingectomy, explained even with the removal of one tube her fertility should 

not be significantly effected


2.) Hgb 11.6











JAMES WARD MD             2020 12:33

## 2020-04-25 NOTE — PHYS DOC
Past Medical History


Past Medical History:  No Pertinent History


Past Surgical History:  Appendectomy


Smoking Status:  Never Smoker


Alcohol Use:  None


Drug Use:  None





General Adult


EDM:


Chief Complaint:  ABDOMINAL PAIN IN PREGNANCY





HPI:


HPI:





Patient is a 24-year-old female who states she is about 8 weeks pregnant 

presents with a 1 to 2-day history of progressive lower abdominal pain.  She has

had some associated nausea with the pain.  She denies any vaginal bleeding.  She

denies dysuria.  She has not had any fever chills or sweats.  []





Review of Systems:


Review of Systems:


Constitutional:  Denies fever or chills. []


Eyes:  Denies change in visual acuity. []


HENT:  Denies nasal congestion or sore throat. [] 


Respiratory:  Denies cough or shortness of breath. [] 


Cardiovascular:  Denies chest pain or edema. [] 


GI: Per HPI. [] 


: denies vaginal bleeding or discharge denies dysuria. [] 


Musculoskeletal:  Denies back pain or joint pain. [] 


Integument:  Denies rash. [] 


Neurologic:  Denies headache, focal weakness or sensory changes. [] 


Endocrine:  Denies polyuria or polydipsia. [] 


Lymphatic:  Denies swollen glands. [] 


Psychiatric: Reports anxiety []





Heart Score:


Risk Factors:


Risk Factors:  DM, Current or recent (<one month) smoker, HTN, HLP, family 

history of CAD, obesity.


Risk Scores:


Score 0 - 3:  2.5% MACE over next 6 weeks - Discharge Home


Score 4 - 6:  20.3% MACE over next 6 weeks - Admit for Clinical Observation


Score 7 - 10:  72.7% MACE over next 6 weeks - Early Invasive Strategies





Current Medications:





Current Medications








 Medications


  (Trade)  Dose


 Ordered  Sig/Rosa  Start Time


 Stop Time Status Last Admin


Dose Admin


 


 Fentanyl Citrate


  (Fentanyl 2ml


 Vial)  50 mcg  1X  ONCE  20 11:45


 20 11:46 UNV  





 


 Sodium Chloride  500 ml @ 


 500 mls/hr  1X  ONCE  20 08:45


 20 09:44 DC 20 09:00


500 MLS/HR











Allergies:


Allergies:





Allergies








Coded Allergies Type Severity Reaction Last Updated Verified


 


  No Known Drug Allergies    18 No











Physical Exam:


PE:





Constitutional: Well developed, well nourished, moderate distress, non-toxic 

appearance. []


HENT: Normocephalic, atraumatic, bilateral external ears normal, oropharynx 

moist, no oral exudates, nose normal. []


Eyes: PERRLA, EOMI, conjunctiva normal, no discharge. [] 


Neck: Normal range of motion, no tenderness, supple, no stridor. [] 


Cardiovascular:Heart rate regular rhythm, no murmur []


Lungs & Thorax:  Bilateral breath sounds clear to auscultation []


Abdomen: Tender to palp left side of abdomen with voluntary guarding no rebound.

 [] 


Skin: Warm, dry, no erythema, no rash. [] 


Back: No tenderness, no CVA tenderness. [] 


Extremities: No tenderness, no cyanosis, no clubbing, ROM intact, no edema. [] 


Neurologic: Alert and oriented X 3, normal motor function, normal sensory 

function, no focal deficits noted. []


Psychologic: Anxious. []





Current Patient Data:


Labs:





                                Laboratory Tests








Test


 20


08:10 20


08:14 20


08:17 20


08:19


 


Urine Collection Type Unknown     


 


Urine Color Yellow     


 


Urine Clarity Clear     


 


Urine pH


 5.5 (<5.0-8.0)


 


 


 





 


Urine Specific Gravity


 1.025


(1.000-1.030) 


 


 





 


Urine Protein


 30 mg/dL


(NEG-TRACE) 


 


 





 


Urine Glucose (UA)


 Negative mg/dL


(NEG) 


 


 





 


Urine Ketones (Stick)


 Negative mg/dL


(NEG) 


 


 





 


Urine Blood


 Negative (NEG)


 


 


 





 


Urine Nitrite


 Negative (NEG)


 


 


 





 


Urine Bilirubin


 Negative (NEG)


 


 


 





 


Urine Urobilinogen Dipstick


 0.2 mg/dL (0.2


mg/dL) 


 


 





 


Urine Leukocyte Esterase


 Negative (NEG)


 


 


 





 


Urine RBC 0 /HPF (0-2)     


 


Urine WBC 0 /HPF (0-4)     


 


Urine Squamous Epithelial


Cells Many /LPF  


 


 


 





 


Urine Bacteria


 Few /HPF


(0-FEW) 


 


 





 


Urine Mucus Slight /LPF     


 


White Blood Count


 


 12.9 x10^3/uL


(4.0-11.0)  H 


 





 


Red Blood Count


 


 4.33 x10^6/uL


(3.50-5.40) 


 





 


Hemoglobin


 


 11.6 g/dL


(12.0-15.5)  L 


 





 


Hematocrit


 


 35.7 %


(36.0-47.0)  L 


 





 


Mean Corpuscular Volume


 


 83 fL ()


 


 





 


Mean Corpuscular Hemoglobin  27 pg (25-35)    


 


Mean Corpuscular Hemoglobin


Concent 


 33 g/dL


(31-37) 


 





 


Red Cell Distribution Width


 


 14.2 %


(11.5-14.5) 


 





 


Platelet Count


 


 339 x10^3/uL


(140-400) 


 





 


Neutrophils (%) (Auto)  75 % (31-73)  H  


 


Lymphocytes (%) (Auto)  20 % (24-48)  L  


 


Monocytes (%) (Auto)  5 % (0-9)    


 


Eosinophils (%) (Auto)  1 % (0-3)    


 


Basophils (%) (Auto)  0 % (0-3)    


 


Neutrophils # (Auto)


 


 9.6 x10^3/uL


(1.8-7.7)  H 


 





 


Lymphocytes # (Auto)


 


 2.5 x10^3/uL


(1.0-4.8) 


 





 


Monocytes # (Auto)


 


 0.6 x10^3/uL


(0.0-1.1) 


 





 


Eosinophils # (Auto)


 


 0.2 x10^3/uL


(0.0-0.7) 


 





 


Basophils # (Auto)


 


 0.0 x10^3/uL


(0.0-0.2) 


 





 


Sodium Level


 


 140 mmol/L


(136-145) 


 





 


Potassium Level


 


 3.2 mmol/L


(3.5-5.1)  L 


 





 


Chloride Level


 


 103 mmol/L


() 


 





 


Carbon Dioxide Level


 


 23 mmol/L


(21-32) 


 





 


Anion Gap  14 (6-14)    


 


Blood Urea Nitrogen


 


 12 mg/dL


(7-20) 


 





 


Creatinine


 


 0.6 mg/dL


(0.6-1.0) 


 





 


Estimated GFR


(Cockcroft-Gault) 


 120.8  


 


 





 


BUN/Creatinine Ratio  20 (6-20)    


 


Glucose Level


 


 142 mg/dL


(70-99)  H 


 





 


Calcium Level


 


 8.3 mg/dL


(8.5-10.1)  L 


 





 


Total Bilirubin


 


 0.2 mg/dL


(0.2-1.0) 


 





 


Aspartate Amino Transferase


(AST) 


 14 U/L (15-37)


L 


 





 


Alanine Aminotransferase (ALT)


 


 15 U/L (14-59)


 


 





 


Alkaline Phosphatase


 


 61 U/L


() 


 





 


Total Protein


 


 7.2 g/dL


(6.4-8.2) 


 





 


Albumin


 


 3.4 g/dL


(3.4-5.0) 


 





 


Albumin/Globulin Ratio


 


 0.9 (1.0-1.7)


L 


 





 


Lipase


 


 100 U/L


() 


 





 


POC Urine HCG, Qualitative


 


 


 Hcg positive


(Negative) 





 


Maternal Serum HCG Beta


Subunit 


 


 


 37379 mIU/mL


(0-5)  H





                                Laboratory Tests


20 08:14








                                Laboratory Tests


20 08:14








Vital Signs:





                                   Vital Signs








  Date Time  Temp Pulse Resp B/P (MAP) Pulse Ox O2 Delivery O2 Flow Rate FiO2


 


20 09:11  75  114/69 (84) 100 Room Air  


 


20 08:00 98.6  18     





 98.6       











EKG:


EKG:


[]





Radiology/Procedures:


Radiology/Procedures:


[]


Impression:


REASON: abd pain; New since US done 20


PROCEDURE: OB <14 WKS W/TV





OB ultrasound less than 14 weeks and transvaginal OB ultrasound


 


HISTORY: Abdominal pain and pregnancy


 


COMPARISON: 2020


 


Sonographic examination appearance was performed by transabdominal and 


endovaginal technique. Multiple static images were obtained.


 


OB ultrasound less than 14 weeks transabdominal:


 


The uterus appears normal. The endometrium measures 2.5 mm in thickness. 


There is no gestational sac.


 


The right ovary appears normal normal blood flow and measures 3.6 x 3.7 x 


1.8 cm. There is a dominant follicle that measures 1.6 x 1.9 x 1.5 cm.


 


There is a mass anterior to the right ovary and uterus measures 8.5 x 9.8 


x 3.1 cm.


 


Left ovary appears normal normal blood flow and measures 2.6 x 2.9 x 2.2 


cm.


 


Transvaginal ultrasound pregnancy:


 


The uterus appears normal. There is mild complex free fluid fluid. The 


ovaries appear normal.


 


IMPRESSION:


 


New mass in the pelvis with no evidence of intrauterine pregnancy. 


Findings are highly suspicious for a ruptured ectopic pregnancy. Recommend


stat OB/GYN consultation.





Course & Med Decision Making:


Course & Med Decision Making


Pertinent Labs and Imaging studies reviewed. (See chart for details)





[ED course: Evaluation reveals a 24-year-old G2, P1 who presents with left-sided

 pain and some peritoneal signs.  Ultrasound suggest ruptured ectopic pregnancy.

  A stat consult to OB/GYN Dr. Matute was obtained.]  Patient was given IV 

fentanyl and Zofran during her stay in the department along with 1 L of normal 

saline.





Dragon Disclaimer:


Dragon Disclaimer:


This electronic medical record was generated, in whole or in part, using a voice

recognition dictation system.





Departure


Departure


Impression:  


   Primary Impression:  


   Ectopic pregnancy, tubal


   Qualified Codes:  O00.102 - Left tubal pregnancy without intrauterine 

   pregnancy


Disposition:   ADMITTED AS INPATIENT


Condition:  GUARDED


Referrals:  


NABOR CHIU MD (PCP)











SAMANTHA DUMAS DO             2020 11:45

## 2020-04-25 NOTE — OP
DATE OF SURGERY:  04/25/2020



PREOPERATIVE DIAGNOSIS:  Right ruptured ectopic pregnancy.



POSTOPERATIVE DIAGNOSIS:  Right ruptured ectopic pregnancy.



PROCEDURE:  Right salpingectomy.



SURGEON:  Skinny Ward MD



ANESTHESIA:  General endotracheal intubation.



ESTIMATED BLOOD LOSS:  400 mL. (Determined by subtracting the 

irrigation from the blood canister)



FLUIDS:  1500 mL.



URINE OUTPUT:  75 mL straight cath prior to procedure.



COMPLICATIONS:  None.



FINDINGS:  What appeared to be about a liter of blood in the pelvis.  

Peritoneal, omental, and small bowel adhesions surrounding the right tube.  

The right ovary was enlarged with benign functional cysts.  The ectopic 

was not seen in the right tube or ovary, but did appear a rupture site that 

was bleeding from the right tube.  The left tube and ovary as well as uterus 

appeared normal.



PATHOLOGY:  Right tube.



DESCRIPTION OF PROCEDURE:  The patient was taken to the operating room 

where general endotracheal intubation was obtained without difficulty.  The 
patient

was prepped and draped in normal sterile fashion.  Attention was first turned to

the vagina where a speculum was placed.  At that point, a single tooth tenaculum

was placed on the anterior lip of the cervix.  An acorn uterine manipulator was 
then 

placed into the   cervix.  At that point, the speculum was removed.  Attention 
was then

turned to the abdomen where a 5 mm skin incision was placed in the 
infraumbilical 

fold.  A 5 mm trocar was then placed directly into the abdomen and insufflated 
to 

15 mmHg.  When the laparoscope was placed, all that could be visualized was 
blood.  

With some additional insufflation, the left side wall could be sufficiently 
visualized for 

placement of the second trochar.  The second trocar was then placed on the left

approximately two-thirds between the ischial spine and the umbilicus.  This was

first done by making a 5-mm skin incision and then placing a 5 mm trocar under

direct visualization of the laparoscope.  At that point, the suction 

was used to clear some of the blood.  It appeared that there was at least a 
liter of 

blood in her pelvis. The 5-mm suction  was not sufficient to clear the 
blood, 

so the incision on the left was extend to allow for a 10 mm trocar to be placed.
At that 

point, the pelvis could be visualized.  It was clear that the right ovary was 
enlarged.

The ovary did not appear to have any rupture site and its enlargement appeared 
to be 

related to benign functional cysts.  The right tube encased by peritoneal 
adhesions 

and omental adhesions, as well some adhesions of the small bowel to the 
peritoneal 

adhesions.  These adhesions were consistent with the patient's history of an 
open 

appendectomy that was performed in [place] that became infected and required 
closure 

with secondary intention.  There was no good site to place the third trochar on 
the right, 

so a third trocar was then placed suprapubically. Examination of the right tube 
did not 

reveal the ectopic, but the tube did appear to have a site of rupture that was 
actively 

bleeding.  This portion of the tube was then removed with the Harmonic scalpel. 
This 

portion of the tube was sent to pathology.  The ovary was then examined.  A 
[place] 

was used to poke the cyst to ensure that it was benign.  No blood was noted just


normal cystic fluid.  Once no active bleeding was seen from the tube and 
hemostasis 

was achieved, all the instruments were removed.  The trocars were then removed. 
The 

10 mm fascial incision was then closed with 0 Vicryl in a figure-of-eight 
stitch.  The 

remainder of the skin incisions were closed with 4-0 Vicryl.  The patient 
tolerated the 

procedure well.  Sponges, laps, and needles were correct x 2.  The patient was 
taken 

to the recovery room in stable condition.

 



______________________________

SKINNY WARD MD



DR:  MARIYA/george  JOB#:  [id number] / [id number]

DD:  04/25/2020 17:19  DT:  04/25/2020 17:49

ZAYNAB

## 2020-04-25 NOTE — RAD
OB ultrasound less than 14 weeks and transvaginal OB ultrasound

 

HISTORY: Abdominal pain and pregnancy

 

COMPARISON: April 22, 2020

 

Sonographic examination appearance was performed by transabdominal and 

endovaginal technique. Multiple static images were obtained.

 

OB ultrasound less than 14 weeks transabdominal:

 

The uterus appears normal. The endometrium measures 2.5 mm in thickness. 

There is no gestational sac.

 

The right ovary appears normal normal blood flow and measures 3.6 x 3.7 x 

1.8 cm. There is a dominant follicle that measures 1.6 x 1.9 x 1.5 cm.

 

There is a mass anterior to the right ovary and uterus measures 8.5 x 9.8 

x 3.1 cm.

 

Left ovary appears normal normal blood flow and measures 2.6 x 2.9 x 2.2 

cm.

 

Transvaginal ultrasound pregnancy:

 

The uterus appears normal. There is mild complex free fluid fluid. The 

ovaries appear normal.

 

IMPRESSION:

 

New mass in the pelvis with no evidence of intrauterine pregnancy. 

Findings are highly suspicious for a ruptured ectopic pregnancy. Recommend

stat OB/GYN consultation.

 

Electronically signed by: Renaldo Cardenas III, MD (4/25/2020 11:55 AM) [id number]

## 2020-04-25 NOTE — PDOC4
OPERATIVE NOTE:


PreOp Dx: Right ruptured ectopic pregnancy


PostOp Dx: same


Procedure: Right salpingectomy


Surgeon: MARIO Ward


Anesthesia: GETA


EBL: 400cc


Fluids: 1500cc


UOP: 75cc (straight cath)


Complications: None


Finding: pelvis with ~500.  Peritoneal and omental adhesion surrounding the 

right tube.


Path: right tube, did not appear to contain ectopic











JAMES WARD MD             Apr 25, 2020 15:09

## 2020-04-25 NOTE — OP
DATE OF SURGERY:  04/25/2020



PREOPERATIVE DIAGNOSIS:  Right ruptured ectopic pregnancy.



POSTOPERATIVE DIAGNOSIS:  Right ruptured ectopic pregnancy.



PROCEDURE:  Right salpingectomy.



SURGEON:  Skinny Ward MD



ANESTHESIA:  General endotracheal intubation.



ESTIMATED BLOOD LOSS:  400 mL. (Determined by subtracting the 

irrigation from the blood canister)



FLUIDS:  1500 mL.



URINE OUTPUT:  75 mL straight cath prior to procedure.



COMPLICATIONS:  None.



FINDINGS:  What appeared to be about a liter of blood in the pelvis.  

Peritoneal, omental, and small bowel adhesions surrounding the right tube.  

The right ovary was enlarged with benign functional cysts.  The ectopic 

was not seen in the right tube or ovary, but did appear a rupture site that 

was bleeding from the right tube.  The left tube and ovary as well as uterus 

appeared normal.



PATHOLOGY:  Right tube.



DESCRIPTION OF PROCEDURE:  The patient was taken to the operating room 

where general endotracheal intubation was obtained without difficulty.  The 
patient

was prepped and draped in normal sterile fashion.  Attention was first turned to

the vagina where a speculum was placed.  At that point, a single tooth tenaculum

was placed on the anterior lip of the cervix.  An acorn uterine manipulator was 
then 

placed into the   cervix.  At that point, the speculum was removed.  Attention 
was then

turned to the abdomen where a 5 mm skin incision was placed in the 
infraumbilical 

fold.  A 5 mm trocar was then placed directly into the abdomen and insufflated 
to 

15 mmHg.  When the laparoscope was placed, all that could be visualized was 
blood.  

With some additional insufflation, the left side wall could be sufficiently 
visualized for 

placement of the second trochar.  The second trocar was then placed on the left

approximately two-thirds between the ischial spine and the umbilicus.  This was

first done by making a 5-mm skin incision and then placing a 5 mm trocar under

direct visualization of the laparoscope.  At that point, the suction 

was used to clear some of the blood.  It appeared that there was at least a 
liter of 

blood in her pelvis. The 5-mm suction  was not sufficient to clear the 
blood, 

so the incision on the left was extend to allow for a 10 mm trocar to be placed.
At that 

point, the pelvis could be visualized.  It was clear that the right ovary was 
enlarged.

The ovary did not appear to have any rupture site and its enlargement appeared 
to be 

related to benign functional cysts.  The right tube encased by peritoneal 
adhesions 

and omental adhesions, as well some adhesions of the small bowel to the 
peritoneal 

adhesions.  These adhesions were consistent with the patient's history of an 
open 

appendectomy that was performed in [place] that became infected and required 
closure 

with secondary intention.  There was no good site to place the third trochar on 
the right, 

so a third trocar was then placed suprapubically. Examination of the right tube 
did not 

reveal the ectopic, but the tube did appear to have a site of rupture that was 
actively 

bleeding.  This portion of the tube was then removed with the Harmonic scalpel. 
This 

portion of the tube was sent to pathology.  The ovary was then examined.  A 
[place] 

was used to poke the cyst to ensure that it was benign.  No blood was noted just


normal cystic fluid.  Once no active bleeding was seen from the tube and 
hemostasis 

was achieved, all the instruments were removed.  The trocars were then removed. 
The 

10 mm fascial incision was then closed with 0 Vicryl in a figure-of-eight 
stitch.  The 

remainder of the skin incisions were closed with 4-0 Vicryl.  The patient 
tolerated the 

procedure well.  Sponges, laps, and needles were correct x 2.  The patient was 
taken 

to the recovery room in stable condition.

 



______________________________

SKINNY WARD MD



DR:  MARIYA/george  JOB#:  [id number] / [id number]

DD:  04/25/2020 17:26  DT:  04/25/2020 18:06

ZAYNAB

## 2020-04-26 VITALS — SYSTOLIC BLOOD PRESSURE: 97 MMHG | DIASTOLIC BLOOD PRESSURE: 50 MMHG

## 2020-04-26 VITALS
SYSTOLIC BLOOD PRESSURE: 91 MMHG | SYSTOLIC BLOOD PRESSURE: 91 MMHG | DIASTOLIC BLOOD PRESSURE: 54 MMHG | DIASTOLIC BLOOD PRESSURE: 54 MMHG

## 2020-04-26 VITALS — DIASTOLIC BLOOD PRESSURE: 48 MMHG | SYSTOLIC BLOOD PRESSURE: 85 MMHG

## 2020-04-26 VITALS — SYSTOLIC BLOOD PRESSURE: 88 MMHG | DIASTOLIC BLOOD PRESSURE: 51 MMHG

## 2020-04-26 VITALS — SYSTOLIC BLOOD PRESSURE: 85 MMHG | DIASTOLIC BLOOD PRESSURE: 48 MMHG

## 2020-04-26 VITALS — DIASTOLIC BLOOD PRESSURE: 59 MMHG | SYSTOLIC BLOOD PRESSURE: 95 MMHG

## 2020-04-26 VITALS — DIASTOLIC BLOOD PRESSURE: 56 MMHG | SYSTOLIC BLOOD PRESSURE: 90 MMHG

## 2020-04-26 VITALS — DIASTOLIC BLOOD PRESSURE: 47 MMHG | SYSTOLIC BLOOD PRESSURE: 84 MMHG

## 2020-04-26 LAB
ANION GAP SERPL CALC-SCNC: 6 MMOL/L (ref 6–14)
BASOPHILS # BLD AUTO: 0 X10^3/UL (ref 0–0.2)
BASOPHILS NFR BLD: 0 % (ref 0–3)
BUN SERPL-MCNC: 5 MG/DL (ref 7–20)
CALCIUM SERPL-MCNC: 7.2 MG/DL (ref 8.5–10.1)
CHLORIDE SERPL-SCNC: 107 MMOL/L (ref 98–107)
CO2 SERPL-SCNC: 26 MMOL/L (ref 21–32)
CREAT SERPL-MCNC: 0.5 MG/DL (ref 0.6–1)
EOSINOPHIL NFR BLD: 0 % (ref 0–3)
EOSINOPHIL NFR BLD: 0 X10^3/UL (ref 0–0.7)
ERYTHROCYTE [DISTWIDTH] IN BLOOD BY AUTOMATED COUNT: 13.9 % (ref 11.5–14.5)
ERYTHROCYTE [DISTWIDTH] IN BLOOD BY AUTOMATED COUNT: 14 % (ref 11.5–14.5)
GFR SERPLBLD BASED ON 1.73 SQ M-ARVRAT: 149.1 ML/MIN
GLUCOSE SERPL-MCNC: 99 MG/DL (ref 70–99)
HCT VFR BLD CALC: 20.3 % (ref 36–47)
HCT VFR BLD CALC: 23.5 % (ref 36–47)
HGB BLD-MCNC: 6.8 G/DL (ref 12–15.5)
HGB BLD-MCNC: 8 G/DL (ref 12–15.5)
LYMPHOCYTES # BLD: 1.1 X10^3/UL (ref 1–4.8)
LYMPHOCYTES NFR BLD AUTO: 10 % (ref 24–48)
MCH RBC QN AUTO: 28 PG (ref 25–35)
MCH RBC QN AUTO: 28 PG (ref 25–35)
MCHC RBC AUTO-ENTMCNC: 34 G/DL (ref 31–37)
MCHC RBC AUTO-ENTMCNC: 34 G/DL (ref 31–37)
MCV RBC AUTO: 82 FL (ref 79–100)
MCV RBC AUTO: 83 FL (ref 79–100)
MONO #: 0.7 X10^3/UL (ref 0–1.1)
MONOCYTES NFR BLD: 6 % (ref 0–9)
NEUT #: 9.6 X10^3/UL (ref 1.8–7.7)
NEUTROPHILS NFR BLD AUTO: 84 % (ref 31–73)
PLATELET # BLD AUTO: 188 X10^3/UL (ref 140–400)
PLATELET # BLD AUTO: 208 X10^3/UL (ref 140–400)
POTASSIUM SERPL-SCNC: 3.5 MMOL/L (ref 3.5–5.1)
RBC # BLD AUTO: 2.47 X10^6/UL (ref 3.5–5.4)
RBC # BLD AUTO: 2.83 X10^6/UL (ref 3.5–5.4)
SODIUM SERPL-SCNC: 139 MMOL/L (ref 136–145)
WBC # BLD AUTO: 11.5 X10^3/UL (ref 4–11)
WBC # BLD AUTO: 9.8 X10^3/UL (ref 4–11)

## 2020-04-26 RX ADMIN — IBUPROFEN PRN MG: 200 TABLET, FILM COATED ORAL at 05:32

## 2020-04-26 RX ADMIN — DOCUSATE SODIUM SCH MG: 100 CAPSULE, LIQUID FILLED ORAL at 07:46

## 2020-04-26 NOTE — PDOC
OB/GYN PROGRESS NOTE


Subjective:


Pt states that her pain is much better.  [place] PO.  Voiding. + ambulation.  The pt

denies f/c.  She feels distended.  Discussed the surgery with the pt.





Objective:


Vital Signs:





Vital Signs








  Date Time  Temp Pulse Resp B/P (MAP) Pulse Ox O2 Delivery O2 Flow Rate FiO2


 


20 08:00 98.6 96 18 111/70 (84) 100 Room Air  





 98.6       


 


20 14:54       10 








Vital Signs








  Date Time  Temp Pulse Resp B/P (MAP) Pulse Ox O2 Delivery O2 Flow Rate FiO2


 


20 10:36 98.0 93 18 88/51    





 98.0       


 


20 07:40     97 Room Air  


 


20 19:53       10.0 








Labs:





                                Laboratory Tests








Test


 20


17:20 20


05:25


 


White Blood Count


 15.3 x10^3/uL


(4.0-11.0)  H 11.5 x10^3/uL


(4.0-11.0)  H


 


Red Blood Count


 3.16 x10^6/uL


(3.50-5.40)  L 2.47 x10^6/uL


(3.50-5.40)  L


 


Hemoglobin


 8.6 g/dL


(12.0-15.5)  L 6.8 g/dL


(12.0-15.5)  *L


 


Hematocrit


 26.2 %


(36.0-47.0)  L 20.3 %


(36.0-47.0)  *L


 


Mean Corpuscular Volume


 83 fL ()


 82 fL ()





 


Mean Corpuscular Hemoglobin 27 pg (25-35)   28 pg (25-35)  


 


Mean Corpuscular Hemoglobin


Concent 33 g/dL


(31-37) 34 g/dL


(31-37)


 


Red Cell Distribution Width


 14.2 %


(11.5-14.5) 13.9 %


(11.5-14.5)


 


Platelet Count


 229 x10^3/uL


(140-400) 208 x10^3/uL


(140-400)


 


Neutrophils (%) (Auto)  84 % (31-73)  H


 


Lymphocytes (%) (Auto)  10 % (24-48)  L


 


Monocytes (%) (Auto)  6 % (0-9)  


 


Eosinophils (%) (Auto)  0 % (0-3)  


 


Basophils (%) (Auto)  0 % (0-3)  


 


Neutrophils # (Auto)


 


 9.6 x10^3/uL


(1.8-7.7)  H


 


Lymphocytes # (Auto)


 


 1.1 x10^3/uL


(1.0-4.8)


 


Monocytes # (Auto)


 


 0.7 x10^3/uL


(0.0-1.1)


 


Eosinophils # (Auto)


 


 0.0 x10^3/uL


(0.0-0.7)


 


Basophils # (Auto)


 


 0.0 x10^3/uL


(0.0-0.2)


 


Maternal Serum HCG Beta


Subunit 


 3212 mIU/mL


(0-5)  H


 


Sodium Level


 


 139 mmol/L


(136-145)


 


Potassium Level


 


 3.5 mmol/L


(3.5-5.1)


 


Chloride Level


 


 107 mmol/L


()


 


Carbon Dioxide Level


 


 26 mmol/L


(21-32)


 


Anion Gap  6 (6-14)  


 


Blood Urea Nitrogen


 


 5 mg/dL (7-20)


L


 


Creatinine


 


 0.5 mg/dL


(0.6-1.0)  L


 


Estimated GFR


(Cockcroft-Gault) 


 149.1  





 


Glucose Level


 


 99 mg/dL


(70-99)


 


Calcium Level


 


 7.2 mg/dL


(8.5-10.1)  L





                                Laboratory Tests


20 17:20








20 05:25








                                Laboratory Tests


20 05:25








                                Laboratory Tests


20 05:25











Physical Exam:


GENERAL:       No apparent distress. Alert and oriented.


HEENT:            Head normocephalic, atraumatic.


NECK:              Supple


LUNGS:            Clear to auscultation.


HEART:            RRR, S1, S2 present, pulses intact


ABDOMEN:       Soft, positive bowel sounds.  Somewhat tender


   Inc: dressing dry


EXTREMITIES:  No cyanosis or edema.


NEUROLOGIC:  Normal speech, normal tone


PSYCHIATRIC:  Normal affect, normal mood.


SKIN:                No ulceration.





Assessment & Plan:


A/P 24y  POD #1 s/p L/S right salpingectomy


1.) PO - doing well


2.) Anemia - Hgb 11.6 -> 8.6 (~3hrs PO) -> 6.8, consistent with intraop 

findings, Will transfuse 1U pRBC, if appropriate rise will d/c











EDJAMES B MD             2020 11:50

## 2020-04-27 NOTE — DS
DATE OF DISCHARGE:  2020



ADMISSION DIAGNOSIS:  Ruptured ectopic pregnancy.



DISCHARGE DIAGNOSES:

1.  Ruptured ectopic pregnancy.

2.  Anemia, requiring transfusion.



PROCEDURE:  Right salpingectomy.



BRIEF HOSPITAL COURSE:  The patient is a 24-year-old  3, para 1-0-1-1,

who presented to the ER with abdominal pain.  The patient had been seen

previously in the office on  and an ultrasound during the visit to 
determine her 

EDC.  Despite having a beta hCG greater than 10,000.  No IUP or ectopic 
pregnancy

could be seen.  When the patient returned to the ER on , the

ultrasound revealed a ruptured ectopic pregnancy.  The patient was brought to

the operating room for a right salpingectomy.  The patient underwent said

procedure.  Postoperatively a hemoglobin was obtained and returned at 8.6 from a

preoperative value of 11.6.  This value was higher than expected due to the

amount of blood seen in her pelvis during the surgery.  The following morning, 
her

hemoglobin returned at 6.8, which was more consistent with intraoperative 
findings, 

the patient was given 1 unit of packed red blood cells with appropriate rise to 
8.0.

At that point, the patient was meeting all discharge criteria with a

significant improvement in her pain and was subsequently discharged home.



DISCHARGE INSTRUCTIONS:  The patient was told not to lift anything greater than

20 pounds, pelvic rest for 6 weeks and not to drive on narcotics.



CALL IF:  The patient was to call if she had fevers, chills, nausea, vomiting,

abdominal pain or any additional questions or concerns.



FOLLOWUP APPOINTMENT:  The patient is going to follow up on 2020 at 9:30

a.m. for her postop appointment.



DISCHARGE MEDICATIONS:  The patient was given a prescription for Percocet 5, 10

pills; Motrin 800 mg, 30 pills; ferrous sulfate 325 mg, 30 pills.

 



______________________________

JAMES WARD MD



DR:  MARIYA/george  JOB#:  [id number] / [id number]

DD:  2020 09:47  DT:  2020 10:15

ZAYNAB

## 2020-04-30 ENCOUNTER — HOSPITAL ENCOUNTER (OUTPATIENT)
Dept: HOSPITAL 61 - LAB | Age: 25
Discharge: HOME | End: 2020-04-30
Attending: OBSTETRICS & GYNECOLOGY
Payer: COMMERCIAL

## 2020-04-30 DIAGNOSIS — O00.90: Primary | ICD-10-CM

## 2020-04-30 LAB
ERYTHROCYTE [DISTWIDTH] IN BLOOD BY AUTOMATED COUNT: 14.5 % (ref 11.5–14.5)
HCT VFR BLD CALC: 29.4 % (ref 36–47)
HGB BLD-MCNC: 10 G/DL (ref 12–15.5)
MCH RBC QN AUTO: 28 PG (ref 25–35)
MCHC RBC AUTO-ENTMCNC: 34 G/DL (ref 31–37)
MCV RBC AUTO: 83 FL (ref 79–100)
PLATELET # BLD AUTO: 341 X10^3/UL (ref 140–400)
RBC # BLD AUTO: 3.53 X10^6/UL (ref 3.5–5.4)
WBC # BLD AUTO: 10 X10^3/UL (ref 4–11)

## 2020-04-30 PROCEDURE — 85027 COMPLETE CBC AUTOMATED: CPT

## 2020-04-30 PROCEDURE — 84702 CHORIONIC GONADOTROPIN TEST: CPT

## 2020-04-30 PROCEDURE — 36415 COLL VENOUS BLD VENIPUNCTURE: CPT
